# Patient Record
Sex: MALE | Race: WHITE | Employment: FULL TIME | URBAN - NONMETROPOLITAN AREA
[De-identification: names, ages, dates, MRNs, and addresses within clinical notes are randomized per-mention and may not be internally consistent; named-entity substitution may affect disease eponyms.]

---

## 2018-06-30 ENCOUNTER — HOSPITAL ENCOUNTER (EMERGENCY)
Age: 53
Discharge: HOME | End: 2018-06-30
Payer: COMMERCIAL

## 2018-06-30 VITALS
OXYGEN SATURATION: 96 % | SYSTOLIC BLOOD PRESSURE: 140 MMHG | HEART RATE: 70 BPM | DIASTOLIC BLOOD PRESSURE: 78 MMHG | WEIGHT: 230 LBS | TEMPERATURE: 97.8 F | RESPIRATION RATE: 12 BRPM
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
## 2018-06-30 DIAGNOSIS — Z23 IMMUNIZATION DUE: ICD-10-CM

## 2018-06-30 PROCEDURE — 99211 OFF/OP EST MAY X REQ PHY/QHP: CPT

## 2018-06-30 PROCEDURE — 99213 OFFICE O/P EST LOW 20 MIN: CPT | Performed by: NURSE PRACTITIONER

## 2018-06-30 PROCEDURE — 6360000002 HC RX W HCPCS: Performed by: NURSE PRACTITIONER

## 2018-06-30 PROCEDURE — 90715 TDAP VACCINE 7 YRS/> IM: CPT | Performed by: NURSE PRACTITIONER

## 2018-06-30 PROCEDURE — 90471 IMMUNIZATION ADMIN: CPT | Performed by: NURSE PRACTITIONER

## 2018-06-30 RX ORDER — SULFAMETHOXAZOLE AND TRIMETHOPRIM 800; 160 MG/1; MG/1
1 TABLET ORAL 2 TIMES DAILY
Qty: 20 TABLET | Refills: 0 | Status: SHIPPED | OUTPATIENT
Start: 2018-06-30 | End: 2018-07-10

## 2018-06-30 RX ADMIN — TETANUS TOXOID, REDUCED DIPHTHERIA TOXOID AND ACELLULAR PERTUSSIS VACCINE, ADSORBED 0.5 ML: 5; 2.5; 8; 8; 2.5 SUSPENSION INTRAMUSCULAR at 10:53

## 2018-06-30 ASSESSMENT — ENCOUNTER SYMPTOMS
NAUSEA: 0
SHORTNESS OF BREATH: 0
DIARRHEA: 0
ABDOMINAL PAIN: 0
VOMITING: 0
CHEST TIGHTNESS: 0

## 2018-06-30 NOTE — ED NOTES
2 days 0800, house cat, patient picked up his cat, scratched palm of rt. Hand, swelling, redness noted, painful. Patient soaking rt. Hand in warm water and peroxide. Tolerating well.      Darcie Keller, KANDIS  34/[birth year] Jose Burns 42, KANDIS  92/98/60 [id number]

## 2018-06-30 NOTE — ED PROVIDER NOTES
HISTORY     Patient's family history is not on file. SOCIAL HISTORY     Patient  reports that he has never smoked. He has never used smokeless tobacco. He reports that he drinks alcohol. He reports that he does not use drugs. PHYSICAL EXAM     ED TRIAGE VITALS  BP: (!) 140/78, Temp: 97.8 °F (36.6 °C), Pulse: 70, Resp: 12, SpO2: 96 %  Physical Exam   Constitutional: He is oriented to person, place, and time. Vital signs are normal. He appears well-developed and well-nourished. He is active. He does not appear ill. HENT:   Head: Normocephalic. Mouth/Throat: Mucous membranes are normal.   Eyes: Conjunctivae are normal.   Cardiovascular: Normal rate. Pulmonary/Chest: Effort normal. No accessory muscle usage. No respiratory distress. Musculoskeletal:        Right hand: Normal. Normal sensation noted. Normal strength noted. Hands:  Lymphadenopathy:     He has no cervical adenopathy. He has no axillary adenopathy. Neurological: He is alert and oriented to person, place, and time. Skin: Skin is warm and dry. No rash (on exposed surfaces) noted. He is not diaphoretic. Psychiatric: He has a normal mood and affect. His speech is normal.   Nursing note and vitals reviewed. DIAGNOSTIC RESULTS   Labs:No results found for this visit on 06/30/18. IMAGING:  No orders to display     URGENT CARE COURSE:     Vitals:    06/30/18 1024   BP: (!) 140/78   Pulse: 70   Resp: 12   Temp: 97.8 °F (36.6 °C)   TempSrc: Temporal   SpO2: 96%   Weight: 230 lb (104.3 kg)       Medications   Tetanus-Diphth-Acell Pertussis (BOOSTRIX) injection 0.5 mL (0.5 mLs Intramuscular Given 6/30/18 1053)     PROCEDURES:  None  FINAL IMPRESSION      1. Cat scratch    2. Immunization due        DISPOSITION/PLAN   DISPOSITION    Discharge   Tetanus updated. No regional lymphadenopathy. Physical assessment findings, diagnostic testing(s) if applicable, and vital signs reviewed with patient/patient representative.   Questions answered. Medications as directed, including OTC medications for supportive care. Education provided on medications. Differential diagnosis(s) discussed with patient/patient representative. Home care/self care instructions reviewed with patient/patient representative. Patient is to follow-up with family care provider in 2-3 days if no improvement. Patient is to go to the emergency department if symptoms worsen. Patient/patient representative is aware of care plan, questions answered, verbalizes understanding and is in agreement. Teach back method used for patient/patient representative teaching(s) and printed instructions attached to after visit summary.     PATIENT REFERRED TO:  Susan Ledezma MD  [address]  Emmett Joyner 82  [phone]    Schedule an appointment as soon as possible for a visit in 1 week  for further evalation, If symptoms worsen, GO DIRECTLY TO [address] [place] [address] Urgent Care  [phone] [facility]  [phone]    As needed, If symptoms worsen, GO DIRECTLY TO THE EMERGENCY DEPARTMENT    DISCHARGE MEDICATIONS:  New Prescriptions    SULFAMETHOXAZOLE-TRIMETHOPRIM (BACTRIM DS;SEPTRA DS) 800-160 MG PER TABLET    Take 1 tablet by mouth 2 times daily for 10 days     Current Discharge Medication List          Malinda Michelle, [address], APRN - CNP  06/30/18 2267

## 2019-10-12 ENCOUNTER — HOSPITAL ENCOUNTER (EMERGENCY)
Dept: GENERAL RADIOLOGY | Age: 54
Discharge: HOME | End: 2019-10-12
Payer: COMMERCIAL

## 2019-10-12 ENCOUNTER — HOSPITAL ENCOUNTER (EMERGENCY)
Age: 54
Discharge: HOME | End: 2019-10-12
Payer: COMMERCIAL

## 2019-10-12 VITALS
TEMPERATURE: 97.8 F | WEIGHT: 250 LBS | HEART RATE: 66 BPM | SYSTOLIC BLOOD PRESSURE: 114 MMHG | OXYGEN SATURATION: 95 % | HEIGHT: 74 IN | RESPIRATION RATE: 16 BRPM | BODY MASS INDEX: 32.08 KG/M2 | DIASTOLIC BLOOD PRESSURE: 73 MMHG

## 2019-10-12 DIAGNOSIS — S90.32XA CONTUSION OF LEFT FOOT, INITIAL ENCOUNTER: Primary | ICD-10-CM

## 2019-10-12 PROCEDURE — 73630 X-RAY EXAM OF FOOT: CPT

## 2019-10-12 PROCEDURE — 99203 OFFICE O/P NEW LOW 30 MIN: CPT | Performed by: NURSE PRACTITIONER

## 2019-10-12 PROCEDURE — 99213 OFFICE O/P EST LOW 20 MIN: CPT

## 2019-10-12 RX ORDER — AMOXICILLIN AND CLAVULANATE POTASSIUM 875; 125 MG/1; MG/1
1 TABLET, FILM COATED ORAL 2 TIMES DAILY
Qty: 10 TABLET | Refills: 0 | Status: SHIPPED | OUTPATIENT
Start: 2019-10-12 | End: 2019-10-17

## 2019-10-12 RX ORDER — LOSARTAN POTASSIUM 25 MG/1
25 TABLET ORAL DAILY
COMMUNITY

## 2019-10-12 ASSESSMENT — ENCOUNTER SYMPTOMS
COUGH: 0
GASTROINTESTINAL NEGATIVE: 1
SHORTNESS OF BREATH: 0
CHEST TIGHTNESS: 0
EYES NEGATIVE: 1
COLOR CHANGE: 0

## 2019-10-12 ASSESSMENT — PAIN DESCRIPTION - LOCATION: LOCATION: FOOT

## 2019-10-12 ASSESSMENT — PAIN DESCRIPTION - ORIENTATION: ORIENTATION: LEFT

## 2019-10-12 ASSESSMENT — PAIN SCALES - GENERAL: PAINLEVEL_OUTOF10: 6
